# Patient Record
Sex: FEMALE | Race: WHITE | URBAN - METROPOLITAN AREA
[De-identification: names, ages, dates, MRNs, and addresses within clinical notes are randomized per-mention and may not be internally consistent; named-entity substitution may affect disease eponyms.]

---

## 2024-04-04 ENCOUNTER — OFFICE VISIT (OUTPATIENT)
Age: 49
End: 2024-04-04
Payer: COMMERCIAL

## 2024-04-04 ENCOUNTER — APPOINTMENT (OUTPATIENT)
Age: 49
End: 2024-04-04
Payer: COMMERCIAL

## 2024-04-04 VITALS
BODY MASS INDEX: 27.09 KG/M2 | TEMPERATURE: 97.9 F | HEART RATE: 67 BPM | DIASTOLIC BLOOD PRESSURE: 70 MMHG | HEIGHT: 60 IN | WEIGHT: 138 LBS | SYSTOLIC BLOOD PRESSURE: 122 MMHG | OXYGEN SATURATION: 100 % | RESPIRATION RATE: 18 BRPM

## 2024-04-04 DIAGNOSIS — S43.421A SPRAIN OF RIGHT ROTATOR CUFF CAPSULE, INITIAL ENCOUNTER: Primary | ICD-10-CM

## 2024-04-04 DIAGNOSIS — M25.511 ACUTE PAIN OF RIGHT SHOULDER: ICD-10-CM

## 2024-04-04 DIAGNOSIS — M75.21 BICEPS TENDINITIS, RIGHT: ICD-10-CM

## 2024-04-04 PROCEDURE — 99213 OFFICE O/P EST LOW 20 MIN: CPT | Performed by: PHYSICIAN ASSISTANT

## 2024-04-04 PROCEDURE — 73030 X-RAY EXAM OF SHOULDER: CPT

## 2024-04-04 RX ORDER — SENNOSIDES 8.6 MG
650 CAPSULE ORAL EVERY 8 HOURS PRN
Qty: 30 TABLET | Refills: 0 | Status: SHIPPED | OUTPATIENT
Start: 2024-04-04

## 2024-04-04 NOTE — PATIENT INSTRUCTIONS
Tendinitis   WHAT YOU NEED TO KNOW:   Tendinitis is painful inflammation or breakdown of your tendons. It may also be called tendinopathy. Tendinitis often occurs in the knee, shoulder, ankle, hip, or elbow.  DISCHARGE INSTRUCTIONS:   Return to the emergency department if:   You have increased redness over the joint, or swelling in the joint.    You suddenly cannot move your joint.    Call your doctor if:   You have increased pain even after you take medicine.    You have questions or concerns about your condition or care.    Medicines:   Acetaminophen  decreases pain and fever. It is available without a doctor's order. Ask how much to take and how often to take it. Follow directions. Read the labels of all other medicines you are using to see if they also contain acetaminophen, or ask your doctor or pharmacist. Acetaminophen can cause liver damage if not taken correctly.    NSAIDs , such as ibuprofen, help decrease swelling, pain, and fever. This medicine is available with or without a doctor's order. NSAIDs can cause stomach bleeding or kidney problems in certain people. If you take blood thinner medicine, always ask your healthcare provider if NSAIDs are safe for you. Always read the medicine label and follow directions.    Take your medicine as directed.  Contact your healthcare provider if you think your medicine is not helping or if you have side effects. Tell your provider if you are allergic to any medicine. Keep a list of the medicines, vitamins, and herbs you take. Include the amounts, and when and why you take them. Bring the list or the pill bottles to follow-up visits. Carry your medicine list with you in case of an emergency.    Manage tendinitis:   Rest your tendon  as directed to help it heal. Ask your healthcare provider if you need to stop putting weight on your affected area.    Apply ice  to help decrease swelling and pain. Use an ice pack, or put crushed ice in a plastic bag. Cover the bag with  a towel before you place it on the affected area. Apply ice for 10 to 15 minutes every hour, or as directed.    Use a support device , such as a cane, splint, shoe insert, or brace. A support device may help reduce your pain.    Go to physical therapy  if directed. A physical therapist can teach you exercises to help improve movement and strength, and to decrease pain. You may also learn how to improve your posture, and how to lift or exercise correctly.    Prevent tendinitis:   Warm up and cool down when you exercise.  Run in place slowly or walk at a brisk pace to warm your muscles before you exercise. When you finish exercising, walk for a few minutes to cool down.         Exercise regularly  to strengthen the muscles around your joint. Ease into an exercise routine for the first 3 weeks to prevent another injury. Ask your healthcare provider about the best exercise plan for you. Rest fully between activities.    Use the right equipment  for sports and exercise. Wear braces or tape around weak joints as directed.    Follow up with your doctor as directed:  Write down your questions so you remember to ask them during your visits.  © Copyright [employer] 2023 Information is for End User's use only and may not be sold, redistributed or otherwise used for commercial purposes.  The above information is an  only. It is not intended as medical advice for individual conditions or treatments. Talk to your doctor, nurse or pharmacist before following any medical regimen to see if it is safe and effective for you.  Rotator Cuff Injury   WHAT YOU NEED TO KNOW:   A rotator cuff injury is damage to the muscles or tendons of your rotator cuff. The rotator cuff is a group of muscles and tendons that hold the shoulder joint in place. The damage may include muscle stretching, tendon tears, or bursa inflammation. The bursa is a fluid sac around the joint.       DISCHARGE INSTRUCTIONS:   Call your doctor or orthopedist  if:   You suddenly cannot move your arm.    The pain in your shoulder or arm is not improving, or is worse than before you started treatment.    You have new pain in your neck.    You have questions or concerns about your condition or care.    Medicines:  You may need any of the following:  Acetaminophen  decreases pain and fever. It is available without a doctor's order. Ask how much to take and how often to take it. Follow directions. Read the labels of all other medicines you are using to see if they also contain acetaminophen, or ask your doctor or pharmacist. Acetaminophen can cause liver damage if not taken correctly.    NSAIDs  help decrease swelling and pain or fever. This medicine is available with or without a doctor's order. NSAIDs can cause stomach bleeding or kidney problems in certain people. If you take blood thinner medicine, always ask your healthcare provider if NSAIDs are safe for you. Always read the medicine label and follow directions.    Prescription pain medicine  may be given. Ask your healthcare provider how to take this medicine safely. Some prescription pain medicines contain acetaminophen. Do not take other medicines that contain acetaminophen without talking to your healthcare provider. Too much acetaminophen may cause liver damage. Prescription pain medicine may cause constipation. Ask your healthcare provider how to prevent or treat constipation.     Take your medicine as directed.  Contact your healthcare provider if you think your medicine is not helping or if you have side effects. Tell your provider if you are allergic to any medicine. Keep a list of the medicines, vitamins, and herbs you take. Include the amounts, and when and why you take them. Bring the list or the pill bottles to follow-up visits. Carry your medicine list with you in case of an emergency.    A physical therapist  can teach you exercises to help improve shoulder movement and strength, and decrease pain. You may  learn changes to daily activities that will help decrease stress on your tendons.  Self-care:   Rest your shoulder as directed.  Overuse of your shoulder can make your injury worse. Avoid heavy lifting, putting your arms over your head, or sports that need an overhead or throwing motion. Any of these movements can cause or worsen a rotator cuff injury.    Put ice on your shoulder for 15 minutes every hour, or as directed. Ice helps decrease pain and swelling. Use an ice pack, or put crushed ice in a plastic bag. Wrap a towel around the bag before you put it on your shoulder.     Put heat on your shoulder when directed.  After the first several days, heat may help relax the muscles in your shoulder. Use a heat pack or heating pad. Apply heat for 20 minutes every hour, or as directed.    Follow up with your doctor or orthopedist as directed:  Write down your questions so you remember to ask them during your visits.  © Copyright [employer] 2023 Information is for End User's use only and may not be sold, redistributed or otherwise used for commercial purposes.  The above information is an  only. It is not intended as medical advice for individual conditions or treatments. Talk to your doctor, nurse or pharmacist before following any medical regimen to see if it is safe and effective for you.

## 2024-04-04 NOTE — LETTER
April 4, 2024     Patient: Corinne Ayala   YOB: 1975   Date of Visit: 4/4/2024       To Whom it May Concern:    Corinne Ayala was seen in my clinic on 4/4/2024. She may return to work on 4/5/2024 .    If you have any questions or concerns, please don't hesitate to call.         Sincerely,          Cyrus Farooq PA-C        CC: No Recipients

## 2024-04-04 NOTE — PROGRESS NOTES
[facility]        NAME: Corinne Ayala is a 48 y.o. female  : 1975    MRN: [id number]  DATE: 2024  TIME: 9:26 AM    Assessment and Plan   Sprain of right rotator cuff capsule, initial encounter [S43.421A]  1. Sprain of right rotator cuff capsule, initial encounter  Diclofenac Sodium (VOLTAREN) 1 %    Ambulatory Referral to Physical Therapy    Ambulatory referral to Orthopedic Surgery    acetaminophen (TYLENOL) 650 mg CR tablet      2. Acute pain of right shoulder  XR shoulder 2+ vw right    Diclofenac Sodium (VOLTAREN) 1 %    Ambulatory Referral to Physical Therapy    Ambulatory referral to Orthopedic Surgery    acetaminophen (TYLENOL) 650 mg CR tablet      3. Biceps tendinitis, right  Diclofenac Sodium (VOLTAREN) 1 %    Ambulatory Referral to Physical Therapy    Ambulatory referral to Orthopedic Surgery    acetaminophen (TYLENOL) 650 mg CR tablet            Patient Instructions     Preliminary x-rays reveal possible spur at the glenoid head versus prior injury fragment.     Voltaren 1% gel apply 4 times daily as needed for pain  Tylenol 650 mg 1 tablet every 8 hours as needed for additional pain relief.    Both orthopedic and physical therapy referral generated on your behalf.    Follow up with PCP in 3-5 days.  Proceed to  ER if symptoms worsen.    If tests are performed, our office will contact you with results only if changes need to made to the care plan discussed with you at the visit. [facility]    Chief Complaint     Chief Complaint   Patient presents with    Arm Pain     Started 3 days ago, patient complains of right arm pain.         History of Present Illness       48-year-old LHD female with past medical history of right shoulder fractured at the age of 8.  Today presenting with complaint of right shoulder pain localized to the bicipital groove and also lateral aspect of the shoulder.  Denies any recent trauma, fall or incident.   She admits beginning use of her elliptical machine which requires grasping of the handles and moving forward and back for the first time in a while.  Symptoms have been with patient for 3 days.  Pain is dull and achy.  Radiates down to the lateral aspect of the tricep.  Slightly alleviated with NSAIDs.  Denies numbness paresthesia or weakness.  There is decreased range of motion as per patient.        Review of Systems   Review of Systems   Constitutional:  Negative for fever.   Respiratory:  Negative for cough.    Gastrointestinal:  Negative for constipation, nausea and vomiting.   Musculoskeletal:  Negative for back pain and joint swelling.   Skin:  Negative for color change.   Neurological:  Negative for headaches.         Current Medications       Current Outpatient Medications:     acetaminophen (TYLENOL) 650 mg CR tablet, Take 1 tablet (650 mg total) by mouth every 8 (eight) hours as needed for mild pain, Disp: 30 tablet, Rfl: 0    Diclofenac Sodium (VOLTAREN) 1 %, Apply 2 g topically 4 (four) times a day, Disp: 50 g, Rfl: 0    Current Allergies     Allergies as of 04/04/2024    (No Known Allergies)            The following portions of the patient's history were reviewed and updated as appropriate: allergies, current medications, past family history, past medical history, past social history, past surgical history and problem list.     History reviewed. No pertinent past medical history.    History reviewed. No pertinent surgical history.    History reviewed. No pertinent family history.      Medications have been verified.        Objective   /70   Pulse 67   Temp 97.9 °F (36.6 °C)   Resp 18   Ht 5' (1.524 m)   Wt 62.6 kg (138 lb)   SpO2 100%   BMI 26.95 kg/m²        Physical Exam     Physical Exam  Vitals and nursing note reviewed.   Constitutional:       General: She is not in acute distress.     Appearance: Normal appearance. She is normal weight.   Eyes:      General: No scleral icterus.      Extraocular Movements: Extraocular movements intact.      Conjunctiva/sclera: Conjunctivae normal.      Pupils: Pupils are equal, round, and reactive to light.   Cardiovascular:      Rate and Rhythm: Normal rate and regular rhythm.      Pulses: Normal pulses.   Pulmonary:      Effort: Pulmonary effort is normal. No respiratory distress.   Musculoskeletal:      Right shoulder: Tenderness present. No swelling, deformity, effusion, laceration, bony tenderness or crepitus. Decreased range of motion. Normal strength. Normal pulse.      Left shoulder: Normal.        Arms:       Cervical back: Normal range of motion and neck supple.      Comments: Decreased range of motion on flexion to 30 degrees.  Decreased range of motion on extension 23 degrees.  Tenderness thumb both right.  Tenderness on external rotation none on internal.  Positive Neer's and Hawkin maneuver.   Skin:     General: Skin is warm and dry.      Findings: No bruising, erythema or lesion.   Neurological:      Mental Status: She is alert and oriented to person, place, and time.      Coordination: Coordination normal.      Gait: Gait normal.   Psychiatric:         Mood and Affect: Mood normal.         Behavior: Behavior normal.

## 2024-04-12 ENCOUNTER — OFFICE VISIT (OUTPATIENT)
Dept: OBGYN CLINIC | Facility: CLINIC | Age: 49
End: 2024-04-12

## 2024-04-12 VITALS
HEIGHT: 60 IN | HEART RATE: 84 BPM | WEIGHT: 140.2 LBS | BODY MASS INDEX: 27.52 KG/M2 | SYSTOLIC BLOOD PRESSURE: 117 MMHG | DIASTOLIC BLOOD PRESSURE: 74 MMHG

## 2024-04-12 DIAGNOSIS — M75.31 CALCIFIC TENDINITIS OF RIGHT SHOULDER: Primary | ICD-10-CM

## 2024-04-12 DIAGNOSIS — S46.011A ROTATOR CUFF STRAIN, RIGHT, INITIAL ENCOUNTER: ICD-10-CM

## 2024-04-12 DIAGNOSIS — M75.41 SUBACROMIAL IMPINGEMENT OF RIGHT SHOULDER: ICD-10-CM

## 2024-04-12 DIAGNOSIS — M62.838 TRAPEZIUS MUSCLE SPASM: ICD-10-CM

## 2024-04-12 DIAGNOSIS — M75.21 BICEPS TENDINITIS, RIGHT: ICD-10-CM

## 2024-04-12 RX ORDER — TRIAMCINOLONE ACETONIDE 40 MG/ML
40 INJECTION, SUSPENSION INTRA-ARTICULAR; INTRAMUSCULAR
Status: COMPLETED | OUTPATIENT
Start: 2024-04-12 | End: 2024-04-12

## 2024-04-12 RX ORDER — BUPIVACAINE HYDROCHLORIDE 2.5 MG/ML
1 INJECTION, SOLUTION INFILTRATION; PERINEURAL
Status: COMPLETED | OUTPATIENT
Start: 2024-04-12 | End: 2024-04-12

## 2024-04-12 RX ORDER — BUPIVACAINE HYDROCHLORIDE 2.5 MG/ML
4 INJECTION, SOLUTION INFILTRATION; PERINEURAL
Status: COMPLETED | OUTPATIENT
Start: 2024-04-12 | End: 2024-04-12

## 2024-04-12 RX ADMIN — TRIAMCINOLONE ACETONIDE 40 MG: 40 INJECTION, SUSPENSION INTRA-ARTICULAR; INTRAMUSCULAR at 14:00

## 2024-04-12 RX ADMIN — BUPIVACAINE HYDROCHLORIDE 1 ML: 2.5 INJECTION, SOLUTION INFILTRATION; PERINEURAL at 14:00

## 2024-04-12 RX ADMIN — BUPIVACAINE HYDROCHLORIDE 4 ML: 2.5 INJECTION, SOLUTION INFILTRATION; PERINEURAL at 14:00

## 2024-04-12 NOTE — PROGRESS NOTES
Assessment/Plan:  Assessment/Plan   Diagnoses and all orders for this visit:    Calcific tendinitis of right shoulder  -     Ambulatory referral to Orthopedic Surgery  -     Ambulatory Referral to Physical Therapy; Future  -     Large joint arthrocentesis: R subacromial bursa    Biceps tendinitis, right  -     Ambulatory Referral to Physical Therapy; Future    Rotator cuff strain, right, initial encounter  -     Ambulatory Referral to Physical Therapy; Future    Subacromial impingement of right shoulder  -     Ambulatory Referral to Physical Therapy; Future    Trapezius muscle spasm  -     Ambulatory Referral to Physical Therapy; Future      48-year-old left-hand-dominant female with right shoulder pain and limited range of motion 10 days duration.  Discussed with patient physical exam, radiographs, impression, and plan.  X-rays right shoulder unremarkable for acute osseous abnormality but noted for calcific tendinopathy.  Physical exam cervical spine unremarkable for midline or paraspinal tenderness.  She has intact range of motion cervical spine.  Axial and Spurling's are unremarkable.  Right shoulder for tenderness at the trapezius and lateral and posterior aspects.  She has range of motion limited to forward flexion of 120 degrees, abduction 100 degrees, and internal rotation to the sacrum.  She has normal strength in rotator cuff.  Empty can test is unremarkable.  There is mild pain with Boogie.  She has normal sensation, bicep reflex, radial pulse both upper extremities.  Clinical impression is that she has symptoms from aggravated calcific tendinopathy.  I discussed treatment regimen steroid injection, supplements, and formal therapy.  I administered mixture 3 cc 0.25% bupivacaine and 1 cc Kenalog to the right shoulder subacromial space without complication.  Upon reassessment she demonstrated forward flexion to 160 degrees, abduction 160 degrees, and internal rotation lumbar spine.  She is to take turmeric  vitamin at least 1000 mg daily, tart cherry vitamin at least 1000 mg daily, glucosamine 2-3 times daily.  She is to start physical therapy as soon as possible and do home exercises as directed.  She may continue with topical diclofenac gel 3 times a day as needed.  She will follow-up as needed.        Subjective:   Patient ID: Corinne Ayala is a 48 y.o. female.  Chief Complaint   Patient presents with    Right Shoulder - Pain        48-year-old left-hand-dominant female presents for evaluation right shoulder pain 10 days duration.  She denies particular trauma or inciting event.  She reports few days prior working out on [employer].  Pain described as gradual in onset, generalized to the shoulder, radiating distally to the upper arm, worse with moving the arm, associated with limited range of motion, and improved with resting.  She presented to urgent care where x-ray evaluation was noted for findings suggestive of calcific tendinitis.  She was prescribed topical diclofenac and advised to follow-up with orthopedic care.  She reports that since starting to apply topical diclofenac intense symptoms have started subsiding.    Shoulder Pain  This is a new problem. The current episode started 1 to 4 weeks ago. The problem occurs daily. The problem has been gradually improving. Associated symptoms include arthralgias. Pertinent negatives include no joint swelling, numbness or weakness. Exacerbated by: Arm elevation. She has tried rest and position changes (Topical diclofenac) for the symptoms. The treatment provided mild relief.           The following portions of the patient's history were reviewed and updated as appropriate: She  has a past medical history of Osteoarthritis (4/4/24).  She has No Known Allergies..    Review of Systems   Musculoskeletal:  Positive for arthralgias. Negative for joint swelling.   Neurological:  Negative for weakness and numbness.       Objective:  Vitals:    04/12/24 1356   BP: 117/74    Pulse: 84   Weight: 63.6 kg (140 lb 3.2 oz)   Height: 5' (1.524 m)      Back Exam     Comments:    Cervical spine  - No tenderness  - Normal range of motion  - Negative axial load  - Negative Spurling's      Right Hand Exam     Muscle Strength   Wrist extension: 5/5   Wrist flexion: 5/5   : 5/5     Other   Sensation: normal  Pulse: present      Left Hand Exam     Muscle Strength   Wrist extension: 5/5   Wrist flexion: 5/5   :  5/5     Other   Sensation: normal  Pulse: present      Right Elbow Exam     Tenderness   The patient is experiencing no tenderness.     Range of Motion   The patient has normal right elbow ROM.    Muscle Strength   The patient has normal right elbow strength (5/5 flexion and extension).    Other   Sensation: normal      Left Elbow Exam     Other   Sensation: normal      Right Shoulder Exam     Tenderness   Right shoulder tenderness location: Trapezius, lateral, posterior.    Range of Motion   Active abduction:  100   Forward flexion:  120   Internal rotation 0 degrees:  Sacrum     Muscle Strength   Abduction: 5/5   Internal rotation: 5/5   External rotation: 5/5   Supraspinatus: 5/5     Other   Sensation: normal    Comments:  Negative empty can      Left Shoulder Exam     Other   Sensation: normal           Strength/Myotome Testing     Left Wrist/Hand   Wrist extension: 5  Wrist flexion: 5    Right Wrist/Hand   Wrist extension: 5  Wrist flexion: 5      Physical Exam  Vitals and nursing note reviewed.   Constitutional:       Appearance: Normal appearance. She is well-developed. She is not ill-appearing or diaphoretic.   HENT:      Head: Normocephalic and atraumatic.      Right Ear: External ear normal.      Left Ear: External ear normal.   Eyes:      Conjunctiva/sclera: Conjunctivae normal.   Neck:      Trachea: No tracheal deviation.   Cardiovascular:      Rate and Rhythm: Normal rate.   Pulmonary:      Effort: Pulmonary effort is normal. No respiratory distress.   Abdominal:       "General: There is no distension.   Musculoskeletal:         General: Tenderness present.   Skin:     General: Skin is warm and dry.      Coloration: Skin is not jaundiced or pale.   Neurological:      Mental Status: She is alert and oriented to person, place, and time.   Psychiatric:         Mood and Affect: Mood normal.         Behavior: Behavior normal.         Thought Content: Thought content normal.         Judgment: Judgment normal.         I have personally reviewed pertinent films in PACS and my interpretation is  .  X-rays right shoulder unremarkable for acute osseous abnormality but noted for calcific tendinopathy.    Large joint arthrocentesis: R subacromial bursa  Universal Protocol:  Consent: Verbal consent obtained.  Risks and benefits: risks, benefits and alternatives were discussed  Consent given by: patient  Time out: Immediately prior to procedure a \"time out\" was called to verify the correct patient, procedure, equipment, support staff and site/side marked as required.  Patient understanding: patient states understanding of the procedure being performed  Patient consent: the patient's understanding of the procedure matches consent given  Procedure consent: procedure consent matches procedure scheduled  Relevant documents: relevant documents present and verified  Test results: test results available and properly labeled  Site marked: the operative site was marked  Radiology Images displayed and confirmed. If images not available, report reviewed: imaging studies available  Required items: required blood products, implants, devices, and special equipment available  Patient identity confirmed: verbally with patient  Supporting Documentation  Indications: pain   Procedure Details  Location: shoulder - R subacromial bursa  Preparation: Patient was prepped and draped in the usual sterile fashion  Needle size: 22 G  Ultrasound guidance: no  Approach: posterolateral  Medications administered: 4 mL " bupivacaine 0.25 %; 1 mL bupivacaine 0.25 %; 40 mg triamcinolone acetonide 40 mg/mL    Patient tolerance: patient tolerated the procedure well with no immediate complications  Dressing:  Sterile dressing applied

## 2024-04-12 NOTE — PATIENT INSTRUCTIONS
Over the counter vitamins    - turmeric vitamin at least 1000 mg daily    - tart cherry vitamin at least 1000 mg daily    - glucosamine-chondrointin 2-3 times daily    Diclofenac gel/voltaren  - 3 times daily

## 2024-04-26 ENCOUNTER — EVALUATION (OUTPATIENT)
Dept: PHYSICAL THERAPY | Facility: CLINIC | Age: 49
End: 2024-04-26
Payer: COMMERCIAL

## 2024-04-26 DIAGNOSIS — S43.421A SPRAIN OF RIGHT ROTATOR CUFF CAPSULE, INITIAL ENCOUNTER: ICD-10-CM

## 2024-04-26 DIAGNOSIS — M75.41 SUBACROMIAL IMPINGEMENT OF RIGHT SHOULDER: ICD-10-CM

## 2024-04-26 DIAGNOSIS — M62.838 TRAPEZIUS MUSCLE SPASM: ICD-10-CM

## 2024-04-26 DIAGNOSIS — M25.511 ACUTE PAIN OF RIGHT SHOULDER: ICD-10-CM

## 2024-04-26 DIAGNOSIS — M75.21 BICEPS TENDINITIS, RIGHT: ICD-10-CM

## 2024-04-26 DIAGNOSIS — M75.31 CALCIFIC TENDINITIS OF RIGHT SHOULDER: ICD-10-CM

## 2024-04-26 DIAGNOSIS — S46.011A ROTATOR CUFF STRAIN, RIGHT, INITIAL ENCOUNTER: ICD-10-CM

## 2024-04-26 PROCEDURE — 97161 PT EVAL LOW COMPLEX 20 MIN: CPT

## 2024-04-26 PROCEDURE — 97110 THERAPEUTIC EXERCISES: CPT

## 2024-04-26 PROCEDURE — 97112 NEUROMUSCULAR REEDUCATION: CPT

## 2024-04-26 NOTE — PROGRESS NOTES
PT Evaluation     Today's date: 2024  Patient name: Corinne Ayala  : 1975  MRN: [id number]  Referring provider: Cyrus Farooq PA-C  Dx:   Encounter Diagnosis     ICD-10-CM    1. Acute pain of right shoulder  M25.511 Ambulatory Referral to Physical Therapy      2. Sprain of right rotator cuff capsule, initial encounter  S43.421A Ambulatory Referral to Physical Therapy      3. Biceps tendinitis, right  M75.21 Ambulatory Referral to Physical Therapy     Ambulatory Referral to Physical Therapy      4. Calcific tendinitis of right shoulder  M75.31 Ambulatory Referral to Physical Therapy      5. Rotator cuff strain, right, initial encounter  S46.011A Ambulatory Referral to Physical Therapy      6. Subacromial impingement of right shoulder  M75.41 Ambulatory Referral to Physical Therapy      7. Trapezius muscle spasm  M62.838 Ambulatory Referral to Physical Therapy          Start Time: 1016  Stop Time: 1100  Total time in clinic (min): 44 minutes    Assessment  Assessment details: Pt is a 48 y.o. female presenting to PT services with c/o R shoulder pain. Differential diagnosis includes bicipital calcification vs adhesive capsulitis. Pt has impaired AROM abduction > flexion > IR and has tightness at end range with PROM. Pt has minimally limited R shoulder strength, although pain is present with resisted movements. PT added wall slides in flexion and abduction and scapular retractions to pt's HEP, pt verbalized and demonstrated understanding of proper form. Pt is a good candidate for skilled physical therapy in order to improve R shoulder mobility, R scapular neuromuscular control, decrease pain and increase functional ability.     Impairments: abnormal or restricted ROM, activity intolerance, impaired physical strength, lacks appropriate home exercise program and pain with function    Plan  Patient would benefit from: skilled physical therapy  Planned modality interventions: cryotherapy, biofeedback, TENS,  "thermotherapy: hydrocollator packs and unattended electrical stimulation  Planned therapy interventions: IASTM, joint mobilization, kinesiology taping, abdominal trunk stabilization, massage, manual therapy, nerve gliding, neuromuscular re-education, patient education, postural training, strengthening, stretching, therapeutic activities, therapeutic exercise, home exercise program, functional ROM exercises and flexibility  Frequency: 1x week  Duration in weeks: 6  Plan of Care beginning date: 2024  Plan of Care expiration date: 2024  Treatment plan discussed with: patient        Subjective Evaluation    History of Present Illness  Mechanism of injury: Pt reports that she was in [facility] on the weekend of  and two days while she was there she took advantage of their fitness room and used the elliptical. She has used them previously and had an issue with her hip, then this time it was her shoulder. She started with shoulder pain about 2-3 days after the elliptical. Initially it wasn't that bad, but she noticed that when she was doing her daily activities it brought on the bad pain that continued for days. She states that she went to urgent care where they did an x-ray and prescribed Voltaren orally and topically and gradually it helped. She states that now she feels she is about 75-80% better, she can do most normal things but with certain things she still feels the pain. The pain is random things, like lifting something that is a bit heavier, when she is dressing herself depending on how high she lifts her arm if it's past shoulder height. She states that her sleep isn't good but that isn't new with the shoulder pain.   Patient Goals  Patient goals for therapy: decreased edema, decreased pain, increased motion, increased strength and return to sport/leisure activities  Patient goal: \"to be able to be mobile again without issues, and get back to being active.\"  Pain  Current pain ratin  At best pain " ratin  At worst pain ratin  Location: R proximal bicep, R lateral shoulder  Quality: sharp and throbbing  Alleviating factors: Voltaren, avoiding bothersome movements.  Exacerbated by: reaching over shoulder height, lifting anything with weight.    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  Treatments  Previous treatment: injection treatment        Objective     Active Range of Motion   Cervical/Thoracic Spine     Normal active range of motion  Left Shoulder   External rotation BTH: T3   Internal rotation BTB: T4     Right Shoulder   Flexion: 150 degrees with pain  Abduction: 88 degrees with pain  External rotation BTH: T3   Internal rotation BTB: T8     Passive Range of Motion     Right Shoulder   Flexion: 145 (stretch) degrees   Abduction: 125 degrees with pain    Strength/Myotome Testing     Left Shoulder   Normal muscle strength    Right Shoulder     Planes of Motion   Flexion: 4+   Abduction: 4 (pain)   External rotation at 0°: 5   Internal rotation at 0°: 5     Tests     Right Shoulder   Positive Neer's.   Negative belly press, empty can, external rotation lag sign, full can, Hawkin's and lift-off.              Precautions: OA  Access Code: [email or web address]    POC expires Unit limit Auth Expiration date PT/OT/ST + Visit Limit?   24 4 24 60                            Visit/Unit Tracking  AUTH Status:  Date 4/26              Not required  Used 1               Remaining  59                    Date 4/26            Re-Eval             FOTO             Manuals                                                                 Neuro Re-Ed     "         Scap retraction 5\"x20            Scap wall slides                                                                              Ther Ex             UBE             Wall slides flx/abd 10\"x10            T/s ext                                                                               Ther Activity                                       Gait Training                                       Modalities                                            "

## 2024-05-03 ENCOUNTER — OFFICE VISIT (OUTPATIENT)
Dept: PHYSICAL THERAPY | Facility: CLINIC | Age: 49
End: 2024-05-03
Payer: COMMERCIAL

## 2024-05-03 DIAGNOSIS — M75.41 SUBACROMIAL IMPINGEMENT OF RIGHT SHOULDER: ICD-10-CM

## 2024-05-03 DIAGNOSIS — M25.511 ACUTE PAIN OF RIGHT SHOULDER: Primary | ICD-10-CM

## 2024-05-03 DIAGNOSIS — M75.21 BICEPS TENDINITIS, RIGHT: ICD-10-CM

## 2024-05-03 DIAGNOSIS — M75.31 CALCIFIC TENDINITIS OF RIGHT SHOULDER: ICD-10-CM

## 2024-05-03 DIAGNOSIS — S43.421A SPRAIN OF RIGHT ROTATOR CUFF CAPSULE, INITIAL ENCOUNTER: ICD-10-CM

## 2024-05-03 DIAGNOSIS — S46.011A ROTATOR CUFF STRAIN, RIGHT, INITIAL ENCOUNTER: ICD-10-CM

## 2024-05-03 DIAGNOSIS — M62.838 TRAPEZIUS MUSCLE SPASM: ICD-10-CM

## 2024-05-03 PROCEDURE — 97110 THERAPEUTIC EXERCISES: CPT

## 2024-05-03 PROCEDURE — 97112 NEUROMUSCULAR REEDUCATION: CPT

## 2024-05-03 NOTE — PROGRESS NOTES
"Daily Note     Today's date: 5/3/2024  Patient name: Corinne Ayala  : 1975  MRN: [id number]  Referring provider: Cyrus Farooq PA-C  Dx:   Encounter Diagnosis     ICD-10-CM    1. Acute pain of right shoulder  M25.511       2. Rotator cuff strain, right, initial encounter  S46.011A       3. Sprain of right rotator cuff capsule, initial encounter  S43.421A       4. Subacromial impingement of right shoulder  M75.41       5. Biceps tendinitis, right  M75.21       6. Trapezius muscle spasm  M62.838       7. Calcific tendinitis of right shoulder  M75.31           Start Time: 1336  Stop Time: 1418  Total time in clinic (min): 42 minutes    Subjective: Pt reports that she is doing okay, she has been very busy lately and hasn't been able to do all of her HEP every day, but she has been trying. She states that she hasn't been having much pain but it lets her know if she moves too quickly.       Objective: See treatment diary below      Assessment: Pt responds favorably to today's session with improved shoulder mobility and decreased discomfort. Pt is challenged with scapular neuromuscular control. Pt is fatigued post session.  PT will continue to address postural strengthening and shoulder/thoracic mobility in future sessions.       Plan: Continue per plan of care.  Progress treatment as tolerated.       Precautions: OA  Access Code: [email or web address]    POC expires Unit limit Auth Expiration date PT/OT/ST + Visit Limit?   24 4 24 60                            Visit/Unit Tracking  AUTH Status:  Date 4/26 5/3             Not required  Used 1 2              Remaining  59 58                   Date 4/26 5/3           Re-Eval             FOTO             Manuals                                                                 Neuro Re-Ed             Scap retraction 5\"x20 RTB 2x10            Scap wall slides  5\"x15           B/l shoulder ext  RTB 2x10           Ball on wall  RMB x30 cw/ccw                                " "                  Ther Ex             UBE  pulleys 5'           Wall slides flx/abd 10\"x10            T/s ext   Sit c 1/2 foam 5\"x10           Eccentric finger ladder   5\"x10                                                               Ther Activity                                       Gait Training                                       Modalities                                            "

## 2024-05-10 ENCOUNTER — OFFICE VISIT (OUTPATIENT)
Dept: PHYSICAL THERAPY | Facility: CLINIC | Age: 49
End: 2024-05-10
Payer: COMMERCIAL

## 2024-05-10 DIAGNOSIS — M25.511 ACUTE PAIN OF RIGHT SHOULDER: Primary | ICD-10-CM

## 2024-05-10 DIAGNOSIS — S43.421A SPRAIN OF RIGHT ROTATOR CUFF CAPSULE, INITIAL ENCOUNTER: ICD-10-CM

## 2024-05-10 DIAGNOSIS — M75.41 SUBACROMIAL IMPINGEMENT OF RIGHT SHOULDER: ICD-10-CM

## 2024-05-10 DIAGNOSIS — S46.011A ROTATOR CUFF STRAIN, RIGHT, INITIAL ENCOUNTER: ICD-10-CM

## 2024-05-10 DIAGNOSIS — M75.21 BICEPS TENDINITIS, RIGHT: ICD-10-CM

## 2024-05-10 DIAGNOSIS — M75.31 CALCIFIC TENDINITIS OF RIGHT SHOULDER: ICD-10-CM

## 2024-05-10 DIAGNOSIS — M62.838 TRAPEZIUS MUSCLE SPASM: ICD-10-CM

## 2024-05-10 PROCEDURE — 97112 NEUROMUSCULAR REEDUCATION: CPT

## 2024-05-10 PROCEDURE — 97110 THERAPEUTIC EXERCISES: CPT

## 2024-05-10 NOTE — PROGRESS NOTES
"Daily Note     Today's date: 5/10/2024  Patient name: Corinne Ayala  : 1975  MRN: [id number]  Referring provider: Cyrus Farooq PA-C  Dx:   Encounter Diagnosis     ICD-10-CM    1. Acute pain of right shoulder  M25.511       2. Subacromial impingement of right shoulder  M75.41       3. Biceps tendinitis, right  M75.21       4. Trapezius muscle spasm  M62.838       5. Calcific tendinitis of right shoulder  M75.31       6. Sprain of right rotator cuff capsule, initial encounter  S43.421A       7. Rotator cuff strain, right, initial encounter  S46.011A                      Subjective: Patient states her shoulder is feeling a little better.       Objective: See treatment diary below      Assessment: Cont with outlined program with good tolerance. ROM improving, cont with pain at available end range. Good form with TB exercises. Did well with ecc control. Patient demonstrated fatigue post treatment and would benefit from continued PT      Plan: Progress treatment as tolerated.       Precautions: OA  Access Code: [email or web address]    POC expires Unit limit Auth Expiration date PT/OT/ST + Visit Limit?   24 4 24 60                            Visit/Unit Tracking  AUTH Status:  Date 4/26 5/3 5/10            Not required  Used 1 2 3             Remaining  59 58 57                  Date 4/26 5/3 5/10          Re-Eval             FOTO             Manuals                                                                 Neuro Re-Ed             Scap retraction 5\"x20 RTB 2x10  RTB 2x12          Scap wall slides  5\"x15 5\" x20          B/l shoulder ext  RTB 2x10 RTB 2x12          Ball on wall  RMB x30 cw/ccw RMB x30 CW/CCW                                                 Ther Ex             UBE  pulleys 5' Pulley's 5'          Wall slides flx/abd 10\"x10            T/s ext   Sit c 1/2 foam 5\"x10 Sit c 1/2 foam 5\"x10          Eccentric finger ladder   5\"x10 5\" x10 ea  Flex/  abd          Cane flexion   Supine 5\"x15             "                                     Ther Activity                                       Gait Training                                       Modalities

## 2024-05-17 ENCOUNTER — APPOINTMENT (OUTPATIENT)
Dept: PHYSICAL THERAPY | Facility: CLINIC | Age: 49
End: 2024-05-17
Payer: COMMERCIAL

## 2024-05-24 ENCOUNTER — OFFICE VISIT (OUTPATIENT)
Dept: PHYSICAL THERAPY | Facility: CLINIC | Age: 49
End: 2024-05-24
Payer: COMMERCIAL

## 2024-05-24 DIAGNOSIS — S46.011A ROTATOR CUFF STRAIN, RIGHT, INITIAL ENCOUNTER: ICD-10-CM

## 2024-05-24 DIAGNOSIS — M75.41 SUBACROMIAL IMPINGEMENT OF RIGHT SHOULDER: ICD-10-CM

## 2024-05-24 DIAGNOSIS — S43.421A SPRAIN OF RIGHT ROTATOR CUFF CAPSULE, INITIAL ENCOUNTER: ICD-10-CM

## 2024-05-24 DIAGNOSIS — M62.838 TRAPEZIUS MUSCLE SPASM: ICD-10-CM

## 2024-05-24 DIAGNOSIS — M75.21 BICEPS TENDINITIS, RIGHT: ICD-10-CM

## 2024-05-24 DIAGNOSIS — M25.511 ACUTE PAIN OF RIGHT SHOULDER: Primary | ICD-10-CM

## 2024-05-24 DIAGNOSIS — M75.31 CALCIFIC TENDINITIS OF RIGHT SHOULDER: ICD-10-CM

## 2024-05-24 PROCEDURE — 97112 NEUROMUSCULAR REEDUCATION: CPT

## 2024-05-24 PROCEDURE — 97110 THERAPEUTIC EXERCISES: CPT

## 2024-05-24 NOTE — PROGRESS NOTES
"Daily Note     Today's date: 2024  Patient name: Corinne Ayala  : 1975  MRN: [id number]  Referring provider: Cyrus Farooq PA-C  Dx:   Encounter Diagnosis     ICD-10-CM    1. Acute pain of right shoulder  M25.511       2. Calcific tendinitis of right shoulder  M75.31       3. Subacromial impingement of right shoulder  M75.41       4. Sprain of right rotator cuff capsule, initial encounter  S43.421A       5. Rotator cuff strain, right, initial encounter  S46.011A       6. Biceps tendinitis, right  M75.21       7. Trapezius muscle spasm  M62.838           Start Time: 930  Stop Time: 1012  Total time in clinic (min): 42 minutes    Subjective: Patient reports that her shoulder overall has been a little bit better, she does still have some of the pain/discomfort and two nights ago her whole body was achy. She states that her shoulder is no worse.      Objective: See treatment diary below      Assessment:       Plan: Progress treatment as tolerated.       Precautions: OA  Access Code: [email or web address]    POC expires Unit limit Auth Expiration date PT/OT/ST + Visit Limit?   24 4 24 60                            Visit/Unit Tracking  AUTH Status:  Date 4/26 5/3 5/10 5/24           Not required  Used 1 2 3 4            Remaining  59 58 57 56                 Date 4/26 5/3 5/10 5/24         Re-Eval             FOTO             Manuals                                                                 Neuro Re-Ed             Scap retraction 5\"x20 RTB 2x10  RTB 2x12          Scap wall slides  5\"x15 5\" x20 5\"x10         B/l shoulder ext  RTB 2x10 RTB 2x12          Ball on wall  RMB x30 cw/ccw RMB x30 CW/CCW                                    Ther Ex             UBE  pulleys 5' Pulley's 5' 3'/3'         Wall slides flx/abd 10\"x10            T/s ext   Sit c 1/2 foam 5\"x10 Sit c 1/2 foam 5\"x10 Sit c 1/2 foam  5\"x10         Eccentric finger ladder   5\"x10 5\" x10 ea  Flex/  abd          Cane flexion   Supine 5\"x15 AROM " "x10 sit         TRX flexion stretch    10\"x10         Posterior capsule stretch    TRX 15\"x5                      Ther Activity                                       Gait Training                                       Modalities                                              "

## 2024-05-31 ENCOUNTER — OFFICE VISIT (OUTPATIENT)
Dept: PHYSICAL THERAPY | Facility: CLINIC | Age: 49
End: 2024-05-31
Payer: COMMERCIAL

## 2024-05-31 DIAGNOSIS — S46.011A ROTATOR CUFF STRAIN, RIGHT, INITIAL ENCOUNTER: ICD-10-CM

## 2024-05-31 DIAGNOSIS — M62.838 TRAPEZIUS MUSCLE SPASM: ICD-10-CM

## 2024-05-31 DIAGNOSIS — S43.421A SPRAIN OF RIGHT ROTATOR CUFF CAPSULE, INITIAL ENCOUNTER: ICD-10-CM

## 2024-05-31 DIAGNOSIS — M75.41 SUBACROMIAL IMPINGEMENT OF RIGHT SHOULDER: ICD-10-CM

## 2024-05-31 DIAGNOSIS — M75.31 CALCIFIC TENDINITIS OF RIGHT SHOULDER: ICD-10-CM

## 2024-05-31 DIAGNOSIS — M25.511 ACUTE PAIN OF RIGHT SHOULDER: Primary | ICD-10-CM

## 2024-05-31 DIAGNOSIS — M75.21 BICEPS TENDINITIS, RIGHT: ICD-10-CM

## 2024-05-31 PROCEDURE — 97110 THERAPEUTIC EXERCISES: CPT

## 2024-05-31 PROCEDURE — 97112 NEUROMUSCULAR REEDUCATION: CPT

## 2024-05-31 NOTE — PROGRESS NOTES
"Daily Note     Today's date: 2024  Patient name: Corinne Ayala  : 1975  MRN: [id number]  Referring provider: Cyrus Farooq PA-C  Dx:   Encounter Diagnosis     ICD-10-CM    1. Acute pain of right shoulder  M25.511       2. Rotator cuff strain, right, initial encounter  S46.011A       3. Calcific tendinitis of right shoulder  M75.31       4. Biceps tendinitis, right  M75.21       5. Subacromial impingement of right shoulder  M75.41       6. Trapezius muscle spasm  M62.838       7. Sprain of right rotator cuff capsule, initial encounter  S43.421A             Start Time: 930  Stop Time: 1009  Total time in clinic (min): 39 minutes    Subjective: Patient reports that she is doing well and her shoulder is getting progressively better.       Objective: See treatment diary below      Assessment: Pt is challenged with progressions during today's session. Continues to respond favorably to capsular stretching. Will monitor continued response and update HEP as needed. Will continue to benefit from skilled physical therapy in order to improve shoulder mobility, decrease pain, and improve strength to return to PLOF.       Plan: Progress treatment as tolerated.       Precautions: OA  Access Code: [email or web address]    POC expires Unit limit Auth Expiration date PT/OT/ST + Visit Limit?   24 4 24 60                            Visit/Unit Tracking  AUTH Status:  Date 4/26 5/3 5/10 5/24 5/31          Not required  Used 1 2 3 4 5           Remaining  59 58 57 56 55                Date 4/26 5/3 5/10 5/24 5/31        Re-Eval             FOTO     70/72        Manuals                                                                 Neuro Re-Ed             Scap retraction 5\"x20 RTB 2x10  RTB 2x12          Scap wall slides  5\"x15 5\" x20 5\"x10 RTB 5\"x10        B/l shoulder ext  RTB 2x10 RTB 2x12          Ball on wall  RMB x30 cw/ccw RMB x30 CW/CCW                                    Ther Ex             UBE  pulleys 5' Pulley's " "5' 3'/3' 3'/3'        Wall slides flx/abd 10\"x10    Abd 5\"x10 ea        T/s ext   Sit c 1/2 foam 5\"x10 Sit c 1/2 foam 5\"x10 Sit c 1/2 foam  5\"x10 Sit c 1/2 foam 5\"x12        Eccentric finger ladder   5\"x10 5\" x10 ea  Flex/  abd          Cane flexion   Supine 5\"x15 AROM x10 sit AROM 1x10   1# 1x10 sit         Shoulder abduction     2x10 1#        TRX flexion stretch    10\"x10 10\"x10        Posterior capsule stretch    TRX 15\"x5 TRX 15\"x5                     Ther Activity                                       Gait Training                                       Modalities                                              "

## 2024-06-07 ENCOUNTER — EVALUATION (OUTPATIENT)
Dept: PHYSICAL THERAPY | Facility: CLINIC | Age: 49
End: 2024-06-07
Payer: COMMERCIAL

## 2024-06-07 DIAGNOSIS — S46.011A ROTATOR CUFF STRAIN, RIGHT, INITIAL ENCOUNTER: ICD-10-CM

## 2024-06-07 DIAGNOSIS — M75.31 CALCIFIC TENDINITIS OF RIGHT SHOULDER: ICD-10-CM

## 2024-06-07 DIAGNOSIS — S43.421A SPRAIN OF RIGHT ROTATOR CUFF CAPSULE, INITIAL ENCOUNTER: ICD-10-CM

## 2024-06-07 DIAGNOSIS — M25.511 ACUTE PAIN OF RIGHT SHOULDER: Primary | ICD-10-CM

## 2024-06-07 DIAGNOSIS — M75.21 BICEPS TENDINITIS, RIGHT: ICD-10-CM

## 2024-06-07 DIAGNOSIS — M75.41 SUBACROMIAL IMPINGEMENT OF RIGHT SHOULDER: ICD-10-CM

## 2024-06-07 DIAGNOSIS — M62.838 TRAPEZIUS MUSCLE SPASM: ICD-10-CM

## 2024-06-07 PROCEDURE — 97112 NEUROMUSCULAR REEDUCATION: CPT

## 2024-06-07 PROCEDURE — 97110 THERAPEUTIC EXERCISES: CPT

## 2024-06-07 NOTE — PROGRESS NOTES
PT Discharge    Today's date: 2024  Patient name: Corinne Ayala  : 1975  MRN: [id number]  Referring provider: Cyrus Farooq PA-C  Dx:   Encounter Diagnosis     ICD-10-CM    1. Acute pain of right shoulder  M25.511       2. Rotator cuff strain, right, initial encounter  S46.011A       3. Trapezius muscle spasm  M62.838       4. Subacromial impingement of right shoulder  M75.41       5. Sprain of right rotator cuff capsule, initial encounter  S43.421A       6. Calcific tendinitis of right shoulder  M75.31       7. Biceps tendinitis, right  M75.21           Start Time: 09  Stop Time: 1011  Total time in clinic (min): 29 minutes    Assessment    Assessment details: Pt is a 48 y.o. female presenting to PT services with c/o R shoulder pain. Differential diagnosis includes bicipital calcification vs adhesive capsulitis. Pt has bene participating in PT for 6 weeks and has made notable improvement in regards to R shoulder mobility, decreased intensity and frequency of pain, and improved functional ability. PT and pt have discussed and agreed that pt has met maximum benefit of skilled physical therapy and will continue to benefit from independent performance of HEP. PT updated and reviewed HEP. Pt was informed that if she has any questions or concerns, she is welcome to contact facility. Pt is discharged from skilled physical therapy at this time.     Goals  STG (3 weeks):  1. Pt will improve R shoulder flexion AROM to be at least 160* GOAL MET  2. Pt will improve R shoulder abduction AROM to be at least 100* GOAL MET  3. Pt will improve R shoulder global strength to be 5/5 GOAL MET    LTG (6 weeks):  1. Pt will be independent in HEP GOAL MET  2. Pt will improve R shoulder AROM flexion to be WNL GOAL MET  3. Pt will improve R shoulder abduction AROM to be WNL NOT MET  4. Pt will report pain at worst as 3/10 or less. GOAL MET    Plan  Patient would benefit from: home program    Planned therapy interventions:  "patient education and home exercise program    Treatment plan discussed with: patient        Subjective Evaluation    History of Present Illness  Mechanism of injury: Pt reports that she feels about 90% better since beginning PT. She feels she still has trouble with lifting anything heavy. She states that she can put her pony tail in now which she couldn't do before but there is still some strain there. She states that a lot of her day to day things have gotten easier; she can now lift her jar of vitamin C, showering, washing her hair, etc.   Patient Goals  Patient goals for therapy: decreased edema, decreased pain, increased motion, increased strength and return to sport/leisure activities  Patient goal: \"to be able to be mobile again without issues, and get back to being active.\"  Pain  Current pain rating: 3  At best pain ratin  At worst pain rating: 3  Location: R anterior shoulder  Quality: dull ache  Alleviating factors: Stretching.  Exacerbated by: reaching over shoulder height, lifting anything with weight.  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  Treatments  Previous treatment: injection treatment        Objective     Active Range of Motion   Cervical/Thoracic Spine     Normal active range of motion  Left Shoulder   External rotation BTH: T3   Internal rotation BTB: T4     Right Shoulder   Flexion: 180 degrees   Abduction: 145 degrees with pain  External rotation BTH: T3   Internal rotation BTB: T6 (tight)     Strength/Myotome Testing     Left Shoulder   Normal muscle strength    Right Shoulder     Planes " "of Motion   Flexion: 5   Abduction: 5 (a little pain)   External rotation at 0°: 5   Internal rotation at 0°: 5     Tests     Right Shoulder   Positive Neer's.   Negative belly press, empty can, external rotation lag sign, full can, Hawkin's and lift-off.       Flowsheet Rows      Flowsheet Row Most Recent Value   PT/OT G-Codes    Current Score 52   Projected Score 72               Precautions: OA  Access Code: [email or web address]    POC expires Unit limit Auth Expiration date PT/OT/ST + Visit Limit?   6/7/24 4 12/31/24 60                            Visit/Unit Tracking  AUTH Status:  Date 4/26 5/3 5/10 5/24 5/31 6/7         Not required  Used 1 2 3 4 5 6          Remaining  59 58 57 56 55 54               Date 4/26 5/3 5/10 5/24 5/31 6/7       Re-Eval             FOTO     70/72 MET       Manuals                                                                 Neuro Re-Ed             Scap retraction 5\"x20 RTB 2x10  RTB 2x12   RTB 2x10       Scap wall slides  5\"x15 5\" x20 5\"x10 RTB 5\"x10        B/l shoulder ext  RTB 2x10 RTB 2x12   RTB 2x10       Ball on wall  RMB x30 cw/ccw RMB x30 CW/CCW                                    Ther Ex             Pt edu      SY       UBE  pulleys 5' Pulley's 5' 3'/3' 3'/3' 3'/3'       Wall slides flx/abd 10\"x10    Abd 5\"x10 ea        T/s ext   Sit c 1/2 foam 5\"x10 Sit c 1/2 foam 5\"x10 Sit c 1/2 foam  5\"x10 Sit c 1/2 foam 5\"x12        Eccentric finger ladder   5\"x10 5\" x10 ea  Flex/  abd          Cane flexion   Supine 5\"x15 AROM x10 sit AROM 1x10   1# 1x10 sit         Shoulder abduction     2x10 1# 2x10 2#       TRX flexion stretch    10\"x10 10\"x10 10\"x10       Posterior capsule stretch    TRX 15\"x5 TRX 15\"x5 30\"x4                    Ther Activity                                       Gait Training                                       Modalities                                         "

## 2024-09-11 ENCOUNTER — TELEPHONE (OUTPATIENT)
Age: 49
End: 2024-09-11

## 2024-09-11 ENCOUNTER — TELEPHONE (OUTPATIENT)
Dept: NEUROLOGY | Facility: CLINIC | Age: 49
End: 2024-09-11

## 2024-09-11 NOTE — TELEPHONE ENCOUNTER
NP appt is scheduled for sleep med on 9/12 w dr valiente.    Referral is in media section of chart

## 2024-09-12 ENCOUNTER — OFFICE VISIT (OUTPATIENT)
Age: 49
End: 2024-09-12
Payer: COMMERCIAL

## 2024-09-12 VITALS
DIASTOLIC BLOOD PRESSURE: 70 MMHG | HEART RATE: 74 BPM | SYSTOLIC BLOOD PRESSURE: 110 MMHG | HEIGHT: 60 IN | OXYGEN SATURATION: 98 % | BODY MASS INDEX: 28.43 KG/M2 | WEIGHT: 144.8 LBS

## 2024-09-12 DIAGNOSIS — F51.04 CHRONIC INSOMNIA: Primary | ICD-10-CM

## 2024-09-12 DIAGNOSIS — R06.83 SNORING: ICD-10-CM

## 2024-09-12 DIAGNOSIS — G47.19 EXCESSIVE DAYTIME SLEEPINESS: ICD-10-CM

## 2024-09-12 PROCEDURE — 99204 OFFICE O/P NEW MOD 45 MIN: CPT | Performed by: INTERNAL MEDICINE

## 2024-09-12 NOTE — PROGRESS NOTES
Sleep Consultation   Elizabeth Christian 48 y.o. female MRN: [id number]      Reason for consultation: Difficulty sleeping    Requesting physician: Alejandra Perez MD PCP    Assessment/Plan    Suspected sleep apnea  Mallampati class 2, Body mass index is 28.28 kg/m²., Neck Circumference: 13.    He/she is at risk for obstructive sleep apnea based on STOP BANG survey based on snoring, tiredness, age  S/s: Snoring, tiredness, excessive daytime sleepiness  [place] score: 9  I discussed in depth the diagnostic studies and treatment options involved with obstructive sleep apnea  I also discussed in depth the risk of leaving sleep apnea untreated including hypertension, heart failure, arrhythmia, MI and stroke.  The patient is agreeable to undergo testing and treatment of obstructive sleep apnea.  He/she understands the pitfalls he/she may encounter along the way and is willing to attempt CPAP treatment.     Plan  Ordered home sleep study  Patient is amenable to in lab sleep study if home sleep study is negative  Follow-up after study to discuss treatment options    2.  Snoring  Reports loud snoring  I will monitor for improvement with treatment    3.  Excessive daytime sleepiness  [place] score of 9  I will monitor for improvement with treatment    4.  Chronic insomnia  Sleep maintenance insomnia  She is unable to go back to sleep when she wakes up anywhere from 1 to 4 AM  Averaging 5 hours of sleep per night  Previously 6 to 7 hours  Recommended she avoid laying in bed for more than 30 minutes if she is unable to sleep    Plan  Provided referral to cognitive behavioral therapy for insomnia  Can consider doxepin if therapy is ineffective    History of Present Illness   HPI:  Elizabeth Christian is a 48 y.o. female with PMHx as below who presents for evaluation of difficulty sleeping. She goes to bed at 8-8:45 PM.  It takes 45 minutes to fall asleep.  She wakes up 1-2 times per night to use the bathroom.  When she wakes up anywhere from  1 AM to 4 AM she often has difficulty falling back asleep.  She lays in bed for 1 to 2 hours.  She is getting 5 hours of sleep on average.  She does not feel refreshed when she wakes up.  She drinks coffee in the morning and occasionally has a soda at noon.  She has been told that she snores loudly.  She has woken herself up while snoring.  She has an [place] score of 9 and reports excessive daytime sleepiness.  She does not smoke and rarely drinks alcohol.  She has tried melatonin in the past but it helped her fall asleep but not stay asleep.          Review of Systems      Genitourinary none   Cardiology none   Gastrointestinal none   Neurology none   Constitutional none   Integumentary none   Psychiatry none   Musculoskeletal none   Pulmonary none   ENT none   Endocrine none   Hematological none         Historical Information   Past Medical History:   Diagnosis Date    Osteoarthritis 4/4/24    diagnosed     Past Surgical History:   Procedure Laterality Date    TENDON REPAIR  4/4/24     Family History   Problem Relation Age of Onset    Diabetes Mother      Social History     Socioeconomic History    Marital status: /Civil Union     Spouse name: Not on file    Number of children: Not on file    Years of education: Not on file    Highest education level: Not on file   Occupational History    Not on file   Tobacco Use    Smoking status: Never    Smokeless tobacco: Never    Tobacco comments:     Never smoked   Substance and Sexual Activity    Alcohol use: Yes     Alcohol/week: 1.0 standard drink of alcohol     Types: 1 Glasses of wine per week     Comment: Not weekly, averages to 2 -3 times a month    Drug use: Never    Sexual activity: Not Currently     Partners: Male     Birth control/protection: Female Sterilization     Comment: Tubal Ligation   Other Topics Concern    Not on file   Social History Narrative    ** Merged History Encounter **          Social Determinants of Health     Financial Resource  Strain: Not on file   Food Insecurity: Not on file   Transportation Needs: Not on file   Physical Activity: Not on file   Stress: Not on file   Social Connections: Not on file   Intimate Partner Violence: Not on file   Housing Stability: Not on file       Occupational History: Works from home    Meds/Allergies   No Known Allergies    Home medications:  Prior to Admission medications    Medication Sig Start Date End Date Taking? Authorizing Provider   ferrous sulfate 325 (65 Fe) mg tablet Take 325 mg by mouth daily with breakfast   Yes Historical Provider, MD   multivitamin (THERAGRAN) TABS Take 1 tablet by mouth daily   Yes Historical Provider, MD   acetaminophen (TYLENOL) 650 mg CR tablet Take 1 tablet (650 mg total) by mouth every 8 (eight) hours as needed for mild pain  Patient not taking: Reported on 9/12/2024 4/4/24   Denny Zuniga PA-C   Diclofenac Sodium (VOLTAREN) 1 % Apply 2 g topically 4 (four) times a day  Patient not taking: Reported on 9/12/2024 4/4/24   Denny Zuniga PA-C   spironolactone (ALDACTONE) 100 mg tablet Take 1 tablet (100 mg total) by mouth daily  Patient not taking: Reported on 9/12/2024 1/20/21   Sri Davey MD       Vitals:   Blood pressure 110/70, pulse 74, height 5' (1.524 m), weight 65.7 kg (144 lb 12.8 oz), SpO2 98%.,  Body mass index is 28.28 kg/m².  Neck Circumference: 13    Physical Exam  General: Awake alert and oriented x 3, conversant without conversational dyspnea, NAD, normal affect  HEENT:  PERRL, Sclera noninjected, nonicteric OU, Nares patent,  no craniofacial abnormalities, Mucous membranes, moist, no oral lesions, normal dentition, Mallampati class 2  NECK:  Trachea midline, no accessory muscle use, no stridor, no cervical or supraclavicular adenopathy, JVP not elevated  CARDIAC: Reg, single s1/S2, no m/r/g  PULM: CTA bilaterally no wheezing, rhonchi or rales  EXT: No cyanosis, no clubbing, no edema, normal capillary refill  NEURO: no focal neurologic deficits, AAOx3,  "moving all extremities appropriately    Labs: I have personally reviewed pertinent lab results.  No results found for: \"WBC\", \"HGB\", \"HCT\", \"MCV\", \"PLT\"   Lab Results   Component Value Date    CALCIUM 8.8 05/25/2024    K 4.1 05/25/2024    CO2 29 05/25/2024     05/25/2024    BUN 11 05/25/2024    CREATININE 0.60 05/25/2024     Lab Results   Component Value Date    IRON 79 05/25/2024    TIBC 356 05/25/2024    FERRITIN 24.0 05/25/2024     Lab Results   Component Value Date    [id number] 568 03/13/2023     Lab Results   Component Value Date    FOLATE >22.3 03/13/2023         Arterial Blood Gas result:  MYKEL Lee MD  [facility] Sleep Medicine   "

## 2024-09-13 ENCOUNTER — TELEPHONE (OUTPATIENT)
Age: 49
End: 2024-09-13

## 2024-09-13 NOTE — TELEPHONE ENCOUNTER
Writer attempted to contact pt regarding referral for CBT-I to verify services needed and place pt on proper wait list. Lvm to call writer back.

## 2024-09-17 ENCOUNTER — HOSPITAL ENCOUNTER (OUTPATIENT)
Dept: SLEEP CENTER | Facility: CLINIC | Age: 49
Discharge: HOME | End: 2024-09-17
Payer: COMMERCIAL

## 2024-09-17 DIAGNOSIS — G47.19 EXCESSIVE DAYTIME SLEEPINESS: ICD-10-CM

## 2024-09-17 PROCEDURE — G0399 HOME SLEEP TEST/TYPE 3 PORTA: HCPCS

## 2024-09-17 NOTE — PROGRESS NOTES
Home Sleep Study Documentation    HOME STUDY DEVICE: Noxturnal no                                           Gita G3 yes device # 23      Pre-Sleep Home Study:    Set-up and instructions performed by: Omaira    Technician performed demonstration for Patient: yes    Return demonstration performed by Patient: yes    Written instructions provided to Patient: yes    Patient signed consent form: yes        Post-Sleep Home Study:    Additional comments by Patient: pending    Home Sleep Study Failed:pending    Failure reason: pending    Reported or Detected: pending    Scored by: pending

## 2024-09-18 NOTE — TELEPHONE ENCOUNTER
2nd attempt to contact pt to verify services needed and to place pt on proper wait list. Lvm to call writer back. Please ask pt if she wants to be added to wait list ([employer]). Thank you.

## 2024-09-23 PROBLEM — G47.33 OSA (OBSTRUCTIVE SLEEP APNEA): Status: ACTIVE | Noted: 2024-09-23

## 2024-09-25 PROCEDURE — 95806 SLEEP STUDY UNATT&RESP EFFT: CPT | Performed by: INTERNAL MEDICINE

## 2024-10-14 ENCOUNTER — OFFICE VISIT (OUTPATIENT)
Age: 49
End: 2024-10-14
Payer: COMMERCIAL

## 2024-10-14 ENCOUNTER — TELEPHONE (OUTPATIENT)
Age: 49
End: 2024-10-14

## 2024-10-14 VITALS
WEIGHT: 145.2 LBS | DIASTOLIC BLOOD PRESSURE: 80 MMHG | SYSTOLIC BLOOD PRESSURE: 110 MMHG | HEART RATE: 74 BPM | BODY MASS INDEX: 28.51 KG/M2 | HEIGHT: 60 IN | OXYGEN SATURATION: 98 %

## 2024-10-14 DIAGNOSIS — G47.33 OSA (OBSTRUCTIVE SLEEP APNEA): Primary | ICD-10-CM

## 2024-10-14 DIAGNOSIS — G47.00 INSOMNIA, UNSPECIFIED TYPE: ICD-10-CM

## 2024-10-14 PROCEDURE — 99214 OFFICE O/P EST MOD 30 MIN: CPT | Performed by: INTERNAL MEDICINE

## 2024-10-14 NOTE — PROGRESS NOTES
Progress Note - Sleep Medicine  Elizabeth Christian 49 y.o. female MRN: [id number]       Impression & Plan:       Mild obstructive sleep apnea    HST AZEB 5.1, supine AZEB 6.5 versus nonsupine 0.65  Oxygen randell 82%    Initial S/s: Snoring, tiredness, excessive daytime sleepiness, [place] score: 9    High degree of heart rate variability during sleep study    I discussed in depth the  treatment options involved with obstructive sleep apnea  I also discussed in depth the risk of leaving sleep apnea untreated including hypertension, heart failure, arrhythmia, MI and stroke.  The patient is agreeable to undergo  treatment of obstructive sleep apnea.       Plan  Patient will try auto CPAP with pressure range 5-15  Can consider positional therapy if she does not tolerate CPAP      2.  Chronic insomnia  Sleep maintenance insomnia  She is unable to go back to sleep when she wakes up anywhere from 1 to 4 AM  Averaging 5 hours of sleep per night  Previously 6 to 7 hours  Recommended she avoid laying in bed for more than 30 minutes if she is unable to sleep    Plan  Provided referral to cognitive behavioral therapy for insomnia  Can consider doxepin if therapy is ineffective        ______________________________________________________________________    HPI:    Elizabeth Christian  is a 48 y.o. female with PMHx as below who presents for follow-up of mild obstructive sleep apnea. She goes to bed at 8-8:45 PM.  It takes 45 minutes to fall asleep.  She wakes up 1-2 times per night to use the bathroom.  When she wakes up anywhere from 1 AM to 4 AM she often has difficulty falling back asleep.  She lays in bed for 1 to 2 hours.  She is getting 5 hours of sleep on average.  She does not feel refreshed when she wakes up.  She drinks coffee in the morning and occasionally has a soda at noon.  She has been told that she snores loudly.  She has woken herself up while snoring.  She has an [place] score of 9 and reports excessive daytime  sleepiness.  She does not smoke and rarely drinks alcohol.  She has tried melatonin in the past but it helped her fall asleep but not stay asleep.    She recently underwent home sleep study which showed AZEB of 5.1 with oxygen randell 82%.  There is a strong positional component.  She presents today to discuss treatment options.    Review of Systems:  Review of Systems   All other systems reviewed and are negative.        Social history updates:  Social History     Tobacco Use   Smoking Status Never   Smokeless Tobacco Never   Tobacco Comments    Never smoked     Social History     Socioeconomic History    Marital status: /Civil Union     Spouse name: Not on file    Number of children: Not on file    Years of education: Not on file    Highest education level: Not on file   Occupational History    Not on file   Tobacco Use    Smoking status: Never    Smokeless tobacco: Never    Tobacco comments:     Never smoked   Substance and Sexual Activity    Alcohol use: Yes     Alcohol/week: 1.0 standard drink of alcohol     Types: 1 Glasses of wine per week     Comment: Not weekly, averages to 2 -3 times a month    Drug use: Never    Sexual activity: Not Currently     Partners: Male     Birth control/protection: Female Sterilization     Comment: Tubal Ligation   Other Topics Concern    Not on file   Social History Narrative    ** Merged History Encounter **          Social Determinants of Health     Financial Resource Strain: Not on file   Food Insecurity: Not on file   Transportation Needs: Not on file   Physical Activity: Not on file   Stress: Not on file   Social Connections: Not on file   Intimate Partner Violence: Not on file   Housing Stability: Not on file       PhysicalExamination:  Vitals:   /80 (BP Location: Left arm, Patient Position: Sitting, Cuff Size: Large)   Pulse 74   Ht 5' (1.524 m)   Wt 65.9 kg (145 lb 3.2 oz)   SpO2 98%   BMI 28.36 kg/m²     Physical Exam  General:  Awake alert and oriented x  "3, conversant without conversational dyspnea, NAD, normal affect  HEENT:  PERRL, Sclera noninjected, nonicteric OU, Nares patent,  no craniofacial abnormalities, Mucous membranes, moist, no oral lesions, normal dentition  NECK: Trachea midline, no accessory muscle use, no stridor, no cervical or supraclavicular adenopathy, JVP not elevated  CARDIAC: Reg, single s1/S2, no m/r/g  PULM: CTA bilaterally no wheezing, rhonchi or rales  EXT: No cyanosis, no clubbing, no edema, normal capillary refill  NEURO: no focal neurologic deficits, AAOx3, moving all extremities appropriately     Diagnostic Data:  Labs:  I personally reviewed the most recent laboratory data pertinent to today's visit  No visits with results within 6 Month(s) from this visit.   Latest known visit with results is:   No results found for any previous visit.       I have personally reviewed pertinent lab results.  No results found for: \"WBC\", \"HGB\", \"HCT\", \"MCV\", \"PLT\"  Lab Results   Component Value Date    CALCIUM 8.8 05/25/2024    K 4.1 05/25/2024    CO2 29 05/25/2024     05/25/2024    BUN 11 05/25/2024    CREATININE 0.60 05/25/2024     No results found for: \"IGE\"  Lab Results   Component Value Date    ALT 16 05/25/2024    AST 15 05/25/2024    ALKPHOS 45 05/25/2024     Lab Results   Component Value Date    IRON 79 05/25/2024    TIBC 356 05/25/2024    FERRITIN 24.0 05/25/2024     Lab Results   Component Value Date    [id number] 568 03/13/2023     Lab Results   Component Value Date    FOLATE >22.3 03/13/2023         Arterial Blood Gas result:  MYKEL Lee MD  [facility] Sleep Medicine    "

## 2024-10-14 NOTE — TELEPHONE ENCOUNTER
Patient was seen in office today and expressed verbal consent to CPAP with [facility]. Compliance visit will be scheduled at check out.  RX for CPAP and clinicals sent to [facility] via [place]

## 2024-10-17 LAB

## 2024-10-25 LAB
DME PARACHUTE DELIVERY DATE ACTUAL: NORMAL
DME PARACHUTE DELIVERY DATE EXPECTED: NORMAL
DME PARACHUTE DELIVERY DATE REQUESTED: NORMAL
DME PARACHUTE ITEM DESCRIPTION: NORMAL
DME PARACHUTE ORDER STATUS: NORMAL
DME PARACHUTE SUPPLIER NAME: NORMAL
DME PARACHUTE SUPPLIER PHONE: NORMAL

## 2024-12-19 ENCOUNTER — OFFICE VISIT (OUTPATIENT)
Age: 49
End: 2024-12-19
Payer: COMMERCIAL

## 2024-12-19 VITALS
HEART RATE: 76 BPM | OXYGEN SATURATION: 98 % | BODY MASS INDEX: 28.82 KG/M2 | DIASTOLIC BLOOD PRESSURE: 70 MMHG | WEIGHT: 146.8 LBS | HEIGHT: 60 IN | SYSTOLIC BLOOD PRESSURE: 120 MMHG

## 2024-12-19 DIAGNOSIS — F51.04 CHRONIC INSOMNIA: ICD-10-CM

## 2024-12-19 DIAGNOSIS — G47.33 OSA (OBSTRUCTIVE SLEEP APNEA): Primary | ICD-10-CM

## 2024-12-19 PROCEDURE — 99213 OFFICE O/P EST LOW 20 MIN: CPT | Performed by: INTERNAL MEDICINE

## 2024-12-19 NOTE — ASSESSMENT & PLAN NOTE
Primarily sleep onset insomnia  Counseled patent on sleep hygiene measures  Discussed doxepin     Plan  Follow up with CBT I  Will discuss doxepin at next visit

## 2024-12-19 NOTE — PROGRESS NOTES
Name: Elizabeth Christian      : 1975      MRN: [id number]  Encounter Provider: Torey Lee MD  Encounter Date: 2024   Encounter department: [facility] SLEEP MEDICINE [facility]    :  Assessment & Plan  KALI (obstructive sleep apnea)  1. Mild obstructive sleep apnea    HST AZEB 5.1, supine AZEB 6.5 versus nonsupine 0.65  Oxygen randell 82%    Initial S/s: Snoring, tiredness, excessive daytime sleepiness, [place] score: 9    High degree of heart rate variability during sleep study    Compliance from  - 12/15  100%, 7 hours and 31 minutes    APAP -15  95 percentile pressure 10.0  Median leak 4.1  Residual AHI 1.8    She is sleeping better and feels that she is getting more deep sleep  She notes improvement in all symptoms    Plan  Follow up in 6 months         Chronic insomnia  Primarily sleep onset insomnia  Counseled patent on sleep hygiene measures  Discussed doxepin     Plan  Follow up with CBT I  Will discuss doxepin at next visit              History of Present Illness     Elizabeth Christian  is a 49 y.o. female with PMHx as below who presents for follow-up of mild obstructive sleep apnea.      Previous visit   She goes to bed at 8-8:45 PM.  It takes 45 minutes to fall asleep.  She wakes up 1-2 times per night to use the bathroom.  When she wakes up anywhere from 1 AM to 4 AM she often has difficulty falling back asleep.  She lays in bed for 1 to 2 hours.  She is getting 5 hours of sleep on average.  She does not feel refreshed when she wakes up.  She drinks coffee in the morning and occasionally has a soda at noon.  She has been told that she snores loudly.  She has woken herself up while snoring.  She has an [place] score of 9 and reports excessive daytime sleepiness.  She does not smoke and rarely drinks alcohol.  She has tried melatonin in the past but it helped her fall asleep but not stay asleep.    She recently underwent home sleep study which showed AZEB of 5.1 with oxygen randell 82%.  There is a  strong positional component.  She presents today to discuss treatment options.    Current visit  She notes improvement in all symptoms.  She is sleeping better and has more deep sleep.  She is sleeping 6 hours.  She has a nightime awakneing with difficulty falling back asleep occassionally.                     Sitting and reading: (Proxy-Rptd) (P) Slight chance of dozing  Watching TV: (Proxy-Rptd) (P) Slight chance of dozing  Sitting, inactive in a public place (e.g. a theatre or a meeting): (Proxy-Rptd) (P) Slight chance of dozing  As a passenger in a car for an hour without a break: (Proxy-Rptd) (P) Slight chance of dozing  Lying down to rest in the afternoon when circumstances permit: (Proxy-Rptd) (P) Would never doze  Sitting and talking to someone: (Proxy-Rptd) (P) Slight chance of dozing  Sitting quietly after a lunch without alcohol: (Proxy-Rptd) (P) Slight chance of dozing  In a car, while stopped for a few minutes in traffic: (Proxy-Rptd) (P) Would never doze  Total score: (Proxy-Rptd) (P) 6       Review of Systems   All other systems reviewed and are negative.    Pertinent positives/negatives included in HPI and also as noted:       Pertinent Medical History         Medical History Reviewed by provider this encounter:  Meds     .  Past Medical History   Past Medical History:   Diagnosis Date    Osteoarthritis 4/4/24    diagnosed     Past Surgical History:   Procedure Laterality Date    TENDON REPAIR  4/4/24     Family History   Problem Relation Age of Onset    Diabetes Mother     Snoring Mother     Insomnia Mother     Hypertension Mother     Snoring Brother       reports that she has never smoked. She has never used smokeless tobacco. She reports current alcohol use of about 1.0 standard drink of alcohol per week. She reports that she does not use drugs.  Current Outpatient Medications on File Prior to Visit   Medication Sig Dispense Refill    ferrous sulfate 325 (65 Fe) mg tablet Take 325 mg by mouth  daily with breakfast      multivitamin (THERAGRAN) TABS Take 1 tablet by mouth daily      acetaminophen (TYLENOL) 650 mg CR tablet Take 1 tablet (650 mg total) by mouth every 8 (eight) hours as needed for mild pain (Patient not taking: Reported on 10/14/2024) 30 tablet 0    Diclofenac Sodium (VOLTAREN) 1 % Apply 2 g topically 4 (four) times a day (Patient not taking: Reported on 9/12/2024) 50 g 0    spironolactone (ALDACTONE) 100 mg tablet Take 1 tablet (100 mg total) by mouth daily (Patient not taking: Reported on 9/12/2024) 30 tablet 2     No current facility-administered medications on file prior to visit.   No Known Allergies   Current Outpatient Medications on File Prior to Visit   Medication Sig Dispense Refill    ferrous sulfate 325 (65 Fe) mg tablet Take 325 mg by mouth daily with breakfast      multivitamin (THERAGRAN) TABS Take 1 tablet by mouth daily      acetaminophen (TYLENOL) 650 mg CR tablet Take 1 tablet (650 mg total) by mouth every 8 (eight) hours as needed for mild pain (Patient not taking: Reported on 10/14/2024) 30 tablet 0    Diclofenac Sodium (VOLTAREN) 1 % Apply 2 g topically 4 (four) times a day (Patient not taking: Reported on 9/12/2024) 50 g 0    spironolactone (ALDACTONE) 100 mg tablet Take 1 tablet (100 mg total) by mouth daily (Patient not taking: Reported on 9/12/2024) 30 tablet 2     No current facility-administered medications on file prior to visit.      Social History     Tobacco Use    Smoking status: Never    Smokeless tobacco: Never    Tobacco comments:     Never smoked   Substance and Sexual Activity    Alcohol use: Yes     Alcohol/week: 1.0 standard drink of alcohol     Types: 1 Glasses of wine per week     Comment: Not weekly, averages to 2 -3 times a month    Drug use: Never    Sexual activity: Not Currently     Partners: Male     Birth control/protection: Female Sterilization     Comment: Tubal Ligation     Objective   /70 (BP Location: Left arm, Patient Position:  "Sitting, Cuff Size: Large)   Pulse 76   Ht 5' (1.524 m)   Wt 66.6 kg (146 lb 12.8 oz)   SpO2 98%   BMI 28.67 kg/m²        Physical Exam  Vitals reviewed.   HENT:      Head: Normocephalic and atraumatic.      Nose: Nose normal.      Mouth/Throat:      Mouth: Mucous membranes are moist.   Eyes:      Extraocular Movements: Extraocular movements intact.      Pupils: Pupils are equal, round, and reactive to light.   Cardiovascular:      Rate and Rhythm: Normal rate and regular rhythm.      Pulses: Normal pulses.   Pulmonary:      Effort: Pulmonary effort is normal.      Breath sounds: Normal breath sounds.   Abdominal:      General: Abdomen is flat. Bowel sounds are normal.      Palpations: Abdomen is soft.   Musculoskeletal:         General: Normal range of motion.      Cervical back: Normal range of motion.   Skin:     General: Skin is warm.   Neurological:      General: No focal deficit present.      Mental Status: She is alert and oriented to person, place, and time. Mental status is at baseline.       Visit Vitals  /70 (BP Location: Left arm, Patient Position: Sitting, Cuff Size: Large)   Pulse 76   Ht 5' (1.524 m)   Wt 66.6 kg (146 lb 12.8 oz)   SpO2 98%   BMI 28.67 kg/m²   Smoking Status Never   BSA 1.64 m²           Data  No results found for: \"HGB\", \"HCT\", \"MCV\"   Lab Results   Component Value Date    CALCIUM 8.8 05/25/2024    K 4.1 05/25/2024    CO2 29 05/25/2024     05/25/2024    BUN 11 05/25/2024    CREATININE 0.60 05/25/2024     Lab Results   Component Value Date    IRON 79 05/25/2024    TIBC 356 05/25/2024    FERRITIN 24.0 05/25/2024     Lab Results   Component Value Date    AST 15 05/25/2024    ALT 16 05/25/2024             "

## 2024-12-19 NOTE — ASSESSMENT & PLAN NOTE
1. Mild obstructive sleep apnea    HST AZEB 5.1, supine AZEB 6.5 versus nonsupine 0.65  Oxygen randell 82%    Initial S/s: Snoring, tiredness, excessive daytime sleepiness, [place] score: 9    High degree of heart rate variability during sleep study    Compliance from 11/16 - 12/15  100%, 7 hours and 31 minutes    APAP 5-15  95 percentile pressure 10.0  Median leak 4.1  Residual AHI 1.8    She is sleeping better and feels that she is getting more deep sleep  She notes improvement in all symptoms    Plan  Follow up in 6 months

## 2025-06-12 ENCOUNTER — TELEPHONE (OUTPATIENT)
Age: 50
End: 2025-06-12

## 2025-06-23 ENCOUNTER — TELEPHONE (OUTPATIENT)
Age: 50
End: 2025-06-23

## 2025-06-23 ENCOUNTER — OFFICE VISIT (OUTPATIENT)
Age: 50
End: 2025-06-23
Payer: COMMERCIAL

## 2025-06-23 VITALS
BODY MASS INDEX: 29.33 KG/M2 | WEIGHT: 149.4 LBS | HEIGHT: 60 IN | DIASTOLIC BLOOD PRESSURE: 72 MMHG | SYSTOLIC BLOOD PRESSURE: 122 MMHG

## 2025-06-23 DIAGNOSIS — G47.33 OSA (OBSTRUCTIVE SLEEP APNEA): Primary | ICD-10-CM

## 2025-06-23 PROCEDURE — 99213 OFFICE O/P EST LOW 20 MIN: CPT | Performed by: INTERNAL MEDICINE

## 2025-06-23 NOTE — ASSESSMENT & PLAN NOTE
Mild obstructive sleep apnea    HST AZEB 5.1, supine AZEB 6.5 versus nonsupine 0.65  Oxygen randell 82%    Initial S/s: Snoring, tiredness, excessive daytime sleepiness, [place] score: 9    High degree of heart rate variability during sleep study    Compliance from 5/20 - 6/18/2025  93%, 7 hours and 45 minutes    APAP 5-15  95 percentile pressure 10.2  Median leak 0.2  Residual AHI 1.5    She is sleeping better and feels that she is getting more deep sleep  She notes improvement in all symptoms    Plan  Follow-up in 1 year  Ordered CPAP supplies with nasal pillows and chinstrap  Orders:    PAP DME Resupply/Reorder

## 2025-06-23 NOTE — PROGRESS NOTES
Name: Elizabeth Christian      : 1975      MRN: [id number]  Encounter Provider: Torey Lee MD  Encounter Date: 2025   Encounter department: [facility]    :  Assessment & Plan  KALI (obstructive sleep apnea)  Mild obstructive sleep apnea    HST AZEB 5.1, supine AZEB 6.5 versus nonsupine 0.65  Oxygen randell 82%    Initial S/s: Snoring, tiredness, excessive daytime sleepiness, Widener score: 9    High degree of heart rate variability during sleep study    Compliance from  - 2025  93%, 7 hours and 45 minutes    APAP 5-15  95 percentile pressure 10.2  Median leak 0.2  Residual AHI 1.5    She is sleeping better and feels that she is getting more deep sleep  She notes improvement in all symptoms    Plan  Follow-up in 1 year  Ordered CPAP supplies with nasal pillows and chinstrap  Orders:    PAP DME Resupply/Reorder      History of Present Illness     Elizabeth Christian  is a 49 y.o. female with PMHx as below who presents for follow-up of mild obstructive sleep apnea.      Previous visit   She goes to bed at 8-8:45 PM.  It takes 45 minutes to fall asleep.  She wakes up 1-2 times per night to use the bathroom.  When she wakes up anywhere from 1 AM to 4 AM she often has difficulty falling back asleep.  She lays in bed for 1 to 2 hours.  She is getting 5 hours of sleep on average.  She does not feel refreshed when she wakes up.  She drinks coffee in the morning and occasionally has a soda at noon.  She has been told that she snores loudly.  She has woken herself up while snoring.  She has an Widener score of 9 and reports excessive daytime sleepiness.  She does not smoke and rarely drinks alcohol.  She has tried melatonin in the past but it helped her fall asleep but not stay asleep.    She recently underwent home sleep study which showed AZEB of 5.1 with oxygen randell 82%.  There is a strong positional component.  She presents today to discuss treatment options.    Current visit  She notes  improvement in all symptoms.  She is sleeping better and has more deep sleep.  She is sleeping 7 hours.  She has a nightime awakneing with difficulty falling back asleep occassionally.  Current [place] score of 4.  She is using a new chinstrap which has helped.  She has no complaints at this time.                    Sitting and reading: (Proxy-Rptd) (P) Slight chance of dozing  Watching TV: (Proxy-Rptd) (P) Slight chance of dozing  Sitting, inactive in a public place (e.g. a theatre or a meeting): (Proxy-Rptd) (P) Slight chance of dozing  As a passenger in a car for an hour without a break: (Proxy-Rptd) (P) Would never doze  Lying down to rest in the afternoon when circumstances permit: (Proxy-Rptd) (P) Slight chance of dozing  Sitting and talking to someone: (Proxy-Rptd) (P) Would never doze  Sitting quietly after a lunch without alcohol: (Proxy-Rptd) (P) Slight chance of dozing  In a car, while stopped for a few minutes in traffic: (Proxy-Rptd) (P) Would never doze  Total score: (Proxy-Rptd) (P) 5       Review of Systems   Constitutional: Negative.    Eyes: Negative.    Respiratory: Negative.     Cardiovascular: Negative.    Gastrointestinal: Negative.    Endocrine: Negative.    Genitourinary: Negative.    Musculoskeletal: Negative.    Allergic/Immunologic: Negative.    Neurological: Negative.    Psychiatric/Behavioral: Negative.       Pertinent positives/negatives included in HPI and also as noted:       Pertinent Medical History           Medical History Reviewed by provider this encounter:     .  Past Medical History   Past Medical History[1]  Past Surgical History[2]  Family History[3]   reports that she has never smoked. She has never used smokeless tobacco. She reports current alcohol use of about 1.0 standard drink of alcohol per week. She reports that she does not use drugs.  Current Outpatient Medications   Medication Instructions    acetaminophen (TYLENOL) 650 mg, Oral, Every 8 hours PRN    Diclofenac  "Sodium (VOLTAREN) 2 g, Topical, 4 times daily    ferrous sulfate 325 mg, Daily with breakfast    multivitamin (THERAGRAN) TABS 1 tablet, Daily    spironolactone (ALDACTONE) 100 mg, Oral, Daily   Allergies[4]   Medications Ordered Prior to Encounter[5]   Social History[6]  Objective   /72 (BP Location: Right arm, Patient Position: Sitting, Cuff Size: Standard)   Ht 5' (1.524 m)   Wt 67.8 kg (149 lb 6.4 oz)   BMI 29.18 kg/m²        Physical Exam  Vitals reviewed.   HENT:      Head: Normocephalic and atraumatic.      Nose: Nose normal.      Mouth/Throat:      Mouth: Mucous membranes are moist.     Eyes:      Extraocular Movements: Extraocular movements intact.      Pupils: Pupils are equal, round, and reactive to light.       Cardiovascular:      Rate and Rhythm: Normal rate and regular rhythm.      Pulses: Normal pulses.   Pulmonary:      Effort: Pulmonary effort is normal.      Breath sounds: Normal breath sounds.   Abdominal:      General: Abdomen is flat. Bowel sounds are normal.      Palpations: Abdomen is soft.     Musculoskeletal:         General: Normal range of motion.      Cervical back: Normal range of motion.     Skin:     General: Skin is warm.     Neurological:      General: No focal deficit present.      Mental Status: She is alert and oriented to person, place, and time. Mental status is at baseline.       Visit Vitals  /72 (BP Location: Right arm, Patient Position: Sitting, Cuff Size: Standard)   Ht 5' (1.524 m)   Wt 67.8 kg (149 lb 6.4 oz)   BMI 29.18 kg/m²   Smoking Status Never   BSA 1.65 m²           Data  No results found for: \"HGB\", \"HCT\", \"MCV\"   Lab Results   Component Value Date    CALCIUM 8.9 03/21/2025    K 3.7 03/21/2025    CO2 28 03/21/2025     03/21/2025    BUN 10 03/21/2025    CREATININE 0.59 03/21/2025     Lab Results   Component Value Date    IRON 79 05/25/2024    TIBC 356 05/25/2024    FERRITIN 21 03/21/2025     Lab Results   Component Value Date    AST 17 " 03/21/2025    ALT 17 03/21/2025                  [1]   Past Medical History:  Diagnosis Date    Osteoarthritis 4/4/24    diagnosed   [2]   Past Surgical History:  Procedure Laterality Date    TENDON REPAIR  4/4/24   [3]   Family History  Problem Relation Name Age of Onset    Diabetes Mother Jenna Gongora     Snoring Mother Jenna Gongora     Insomnia Mother Jenna Gongora     Hypertension Mother Jenna Gongora     Snoring Brother Gio Gongora    [4] No Known Allergies  [5]   Current Outpatient Medications on File Prior to Visit   Medication Sig Dispense Refill    ferrous sulfate 325 (65 Fe) mg tablet Take 325 mg by mouth daily with breakfast      multivitamin (THERAGRAN) TABS Take 1 tablet by mouth in the morning.      acetaminophen (TYLENOL) 650 mg CR tablet Take 1 tablet (650 mg total) by mouth every 8 (eight) hours as needed for mild pain (Patient not taking: Reported on 10/14/2024) 30 tablet 0    Diclofenac Sodium (VOLTAREN) 1 % Apply 2 g topically 4 (four) times a day (Patient not taking: Reported on 9/12/2024) 50 g 0    spironolactone (ALDACTONE) 100 mg tablet Take 1 tablet (100 mg total) by mouth daily (Patient not taking: Reported on 9/12/2024) 30 tablet 2     No current facility-administered medications on file prior to visit.   [6]   Social History  Tobacco Use    Smoking status: Never    Smokeless tobacco: Never    Tobacco comments:     Never smoked   Substance and Sexual Activity    Alcohol use: Yes     Alcohol/week: 1.0 standard drink of alcohol     Types: 1 Glasses of wine per week     Comment: Not weekly, averages to 2 -3 times a month    Drug use: Never    Sexual activity: Not Currently     Partners: Male     Birth control/protection: Female Sterilization     Comment: Tubal Ligation

## 2025-06-24 LAB
